# Patient Record
Sex: MALE | Race: WHITE | NOT HISPANIC OR LATINO | Employment: FULL TIME | URBAN - METROPOLITAN AREA
[De-identification: names, ages, dates, MRNs, and addresses within clinical notes are randomized per-mention and may not be internally consistent; named-entity substitution may affect disease eponyms.]

---

## 2017-01-20 ENCOUNTER — OFFICE VISIT (OUTPATIENT)
Dept: FAMILY MEDICINE | Facility: CLINIC | Age: 42
End: 2017-01-20
Payer: COMMERCIAL

## 2017-01-20 VITALS
RESPIRATION RATE: 16 BRPM | HEART RATE: 76 BPM | TEMPERATURE: 98 F | BODY MASS INDEX: 23.14 KG/M2 | WEIGHT: 144 LBS | SYSTOLIC BLOOD PRESSURE: 114 MMHG | HEIGHT: 66 IN | DIASTOLIC BLOOD PRESSURE: 64 MMHG

## 2017-01-20 DIAGNOSIS — K21.9 GASTROESOPHAGEAL REFLUX DISEASE WITHOUT ESOPHAGITIS: Primary | ICD-10-CM

## 2017-01-20 PROCEDURE — 99203 OFFICE O/P NEW LOW 30 MIN: CPT | Performed by: FAMILY MEDICINE

## 2017-01-20 NOTE — NURSING NOTE
"Chief Complaint   Patient presents with     Gastrointestinal Problem     concerns with decreased appetite and heartburn x1 week     Initial /64 mmHg  Pulse 76  Temp(Src) 98  F (36.7  C) (Oral)  Resp 16  Ht 5' 6\" (1.676 m)  Wt 144 lb (65.318 kg)  BMI 23.25 kg/m2 Estimated body mass index is 23.25 kg/(m^2) as calculated from the following:    Height as of this encounter: 5' 6\" (1.676 m).    Weight as of this encounter: 144 lb (65.318 kg)..  BP completed using cuff size regular.            Aracely Garner/DALY    "

## 2017-01-20 NOTE — MR AVS SNAPSHOT
After Visit Summary   1/20/2017    Yordy Rios    MRN: [id number]           Patient Information     Date Of Birth          1975        Visit Information        Provider Department      1/20/2017 3:00 PM Mary Beth North MD [facility]        Today's Diagnoses     Gastroesophageal reflux disease without esophagitis    -  1       Care Instructions      Follow up in 1 month or sooner if needed    Lifestyle Changes for Controlling GERD  When you have GERD, stomach acid feels as if it s backing up toward your mouth. Whether or not you take medication to control your GERD, your symptoms can often be improved with lifestyle changes. Talk to your doctor about the following suggestions, which may help you get relief from your symptoms.  Raise Your Head    Reflux is more likely to strike when you re lying down flat, because stomach fluid can flow backward more easily. Raising the head of your bed 4 to 6 inches can help. To do this:    Slide blocks or books under the legs at the head of your bed. Or, place a wedge under the mattress. Many [employer] can make a suitable wedge for you. The wedge should run from your waist to the top of your head.    Don t just prop your head on several pillows. This increases pressure on your stomach. It can make GERD worse.  Watch Your Eating Habits  Certain foods may increase the acid in your stomach or relax the lower esophageal sphincter, making GERD more likely. It s best to avoid the following:    Coffee, tea, and carbonated drinks (with and without caffeine)    Fatty, fried, or spicy food    Mint, chocolate, onions, and tomatoes    Any other foods that seem to irritate your stomach or cause you pain  Relieve the Pressure    Eat smaller meals, even if you have to eat more often.    Don t lie down right after you eat. Wait a few hours for your stomach to empty.    Avoid tight belts and tight-fitting clothes.    Lose excess  weight.  Tobacco and Alcohol  Avoid smoking tobacco and drinking alcohol. They can make GERD symptoms worse.    1912-4853 [employer]. [address] [place] [address], [place] [address]. All rights reserved. This information is not intended as a substitute for professional medical care. Always follow your healthcare professional's instructions.        Discharge Instructions for Gastroesophageal Reflux Disease (GERD)  Gastroesophageal reflux disease (GERD) is a backflow of acid from the stomach into the swallowing tube (esophagus).  Home care  These home care steps can help you manage GERD:    Maintain a healthy weight. Get help to lose any extra pounds.    Avoid lying down after meals.    Avoid eating late at night.    Elevate the head of your bed by 6 inches. You can do this by placing wooden blocks under the head of your bed.    Avoid wearing tight-fitting clothes.    Avoid foods that might irritate your stomach, such as the following:    Alcohol    Fat    Chocolate    Caffeine    Spearmint or peppermint    Talk to your doctor if you are taking any of the following medications. These medications can make GERD symptoms worse:    Calcium channel blockers    Theophylline    Anticholinergic medications such as oxybutynin and benzatropine    Begin an exercise program. Ask your doctor how to get started. You can benefit from simple activities, such as walking or gardening.    Break the smoking habit. Enroll in a stop-smoking program to improve your chances of success.    Limit alcohol intake to no more than 2 drinks a day.    Take your medications exactly as directed. Don t skip doses.    Avoid over-the-counter nonsteroidal anti-inflammatory drugs, such as aspirin and ibuprofen (Advil, Motrin).    If possible, avoid nitrates (heart medications such as nitroglycerin and Isordil).  Follow-up care  Make a follow-up appointment as directed by our staff.     When to seek medical care  Call your doctor immediately if you  "have any of the following:    Trouble swallowing    Pain when swallowing    Feeling of food caught in your chest or throat    Pain in the neck, chest, or back    Heartburn that causes you to vomit    Vomiting blood    Black or tarry stools (from digested blood)    More saliva (watering of the mouth) than usual    Weight loss of more than 3% to 5% of your total body weight in a month    Hoarseness or sore throat that won t go away    Choking, coughing, or wheezing     5989-3066 [employer]. [address] [place] [address]. All rights reserved. This information is not intended as a substitute for professional medical care. Always follow your healthcare professional's instructions.              Follow-ups after your visit        Who to contact     If you have questions or need follow up information about today's clinic visit or your schedule please contact [facility] directly at [phone].  Normal or non-critical lab and imaging results will be communicated to you by [employer], letter or phone within 4 business days after the clinic has received the results. If you do not hear from us within 7 days, please contact the clinic through [employer] or phone. If you have a critical or abnormal lab result, we will notify you by phone as soon as possible.  Submit refill requests through [employer] or call your pharmacy and they will forward the refill request to us. Please allow 3 business days for your refill to be completed.          Additional Information About Your Visit        [employer] Information     [employer] lets you send messages to your doctor, view your test results, renew your prescriptions, schedule appointments and more. To sign up, go to [email or web address] . Click on \"Log in\" on the left side of the screen, which will take you to the Welcome page. Then click on \"Sign up Now\" on the right side of the page.     You will be asked to enter the access code listed below, as " "well as some personal information. Please follow the directions to create your username and password.     Your access code is: [email or web address]  Expires: 2017  3:25 PM     Your access code will  in 90 days. If you need help or a new code, please call your [place] clinic or [phone].        Care EveryWhere ID     This is your Care EveryWhere ID. This could be used by other organizations to access your [facility] medical records  [id number]        Your Vitals Were     Pulse Temperature Respirations Height BMI (Body Mass Index)       76 98  F (36.7  C) (Oral) 16 5' 6\" (1.676 m) 23.25 kg/m2        Blood Pressure from Last 3 Encounters:   17 114/64   09 112/66   09 122/70    Weight from Last 3 Encounters:   17 144 lb (65.318 kg)   09 141 lb (63.957 kg)   08 147 lb (66.679 kg)              Today, you had the following     No orders found for display         Today's Medication Changes          These changes are accurate as of: 17  3:25 PM.  If you have any questions, ask your nurse or doctor.               Start taking these medicines.        Dose/Directions    omeprazole 20 MG CR capsule   Commonly known as:  priLOSEC   Used for:  Gastroesophageal reflux disease without esophagitis   Started by:  Mary Beth North MD        Dose:  20 mg   Take 1 capsule (20 mg) by mouth daily   Quantity:  30 capsule   Refills:  0            Where to get your medicines      These medications were sent to [facility]/pharmacy # - [place], [place] - [address]  [address] [facility] [address]     Phone:  [phone]    - omeprazole 20 MG CR capsule             Primary Care Provider    None Specified       No primary provider on file.        Thank you!     Thank you for choosing [facility]  for your care. Our goal is always to provide you with excellent care. Hearing back from our patients is one way we can continue to improve our services. Please " take a few minutes to complete the written survey that you may receive in the mail after your visit with us. Thank you!             Your Updated Medication List - Protect others around you: Learn how to safely use, store and throw away your medicines at www.disposemymeds.org.          This list is accurate as of: 1/20/17  3:25 PM.  Always use your most recent med list.                   Brand Name Dispense Instructions for use    omeprazole 20 MG CR capsule    priLOSEC    30 capsule    Take 1 capsule (20 mg) by mouth daily

## 2017-01-20 NOTE — PROGRESS NOTES
"  SUBJECTIVE:                                                    Yordy Rios is a 41 year old male who presents to clinic today for the following health issues:      Gerd/Heartburn  Duration of complaint:1 week  Description of symptoms:    food getting stuck: no   nausea/vomiting: no   abdominal pain: YES - epigastric   black/tarry or bloody stools: no :  worse with no particular food or drink.  current NSAID/Aspirin use: no  Constipation: YES  Gas: YES with some belching     Therapies tried and outcome: rolaids without relief     Drinks pop daily at least 2 cans a day.   Job stress this week along with lack of sleep. Has been skipping lunch this week and having a light supper.   Per patient, he has had similar problems in the past especially when really stressed.     Problem list and histories reviewed & adjusted, as indicated.  Additional history: as documented    Problem list, Medication list, Allergies, and Medical/Social/Surgical histories reviewed in EPIC and updated as appropriate.    ROS:  Constitutional, HEENT, cardiovascular, pulmonary, gi, psych,  systems are negative, except as otherwise noted.    OBJECTIVE:                                                    /64 mmHg  Pulse 76  Temp(Src) 98  F (36.7  C) (Oral)  Resp 16  Ht 5' 6\" (1.676 m)  Wt 144 lb (65.318 kg)  BMI 23.25 kg/m2  Body mass index is 23.25 kg/(m^2).  GENERAL: healthy, alert and no distress  NECK: no adenopathy, no asymmetry, masses, or scars and thyroid normal to palpation  RESP: lungs clear to auscultation - no rales, rhonchi or wheezes  CV: regular rate and rhythm, normal S1 S2, no S3 or S4, no murmur, click or rub, no peripheral edema and peripheral pulses strong  ABDOMEN: tenderness epigastric and bowel sounds normal  MS: no gross musculoskeletal defects noted, no edema  PSYCH: mentation appears normal, affect normal/bright    Diagnostic Test Results:  none      ASSESSMENT/PLAN:                                             "          1. Gastroesophageal reflux disease without esophagitis  - will treat with PPI. Lifestyle modifications including stress management discussed.   - omeprazole (PRILOSEC) 20 MG CR capsule; Take 1 capsule (20 mg) by mouth daily  Dispense: 30 capsule; Refill: 0    See Patient Instructions    Mary Beth North MD  [facility]

## 2017-01-20 NOTE — PATIENT INSTRUCTIONS
Follow up in 1 month or sooner if needed    Lifestyle Changes for Controlling GERD  When you have GERD, stomach acid feels as if it s backing up toward your mouth. Whether or not you take medication to control your GERD, your symptoms can often be improved with lifestyle changes. Talk to your doctor about the following suggestions, which may help you get relief from your symptoms.  Raise Your Head    Reflux is more likely to strike when you re lying down flat, because stomach fluid can flow backward more easily. Raising the head of your bed 4 to 6 inches can help. To do this:    Slide blocks or books under the legs at the head of your bed. Or, place a wedge under the mattress. Many [employer] can make a suitable wedge for you. The wedge should run from your waist to the top of your head.    Don t just prop your head on several pillows. This increases pressure on your stomach. It can make GERD worse.  Watch Your Eating Habits  Certain foods may increase the acid in your stomach or relax the lower esophageal sphincter, making GERD more likely. It s best to avoid the following:    Coffee, tea, and carbonated drinks (with and without caffeine)    Fatty, fried, or spicy food    Mint, chocolate, onions, and tomatoes    Any other foods that seem to irritate your stomach or cause you pain  Relieve the Pressure    Eat smaller meals, even if you have to eat more often.    Don t lie down right after you eat. Wait a few hours for your stomach to empty.    Avoid tight belts and tight-fitting clothes.    Lose excess weight.  Tobacco and Alcohol  Avoid smoking tobacco and drinking alcohol. They can make GERD symptoms worse.    4173-4196 [employer]. [address] [place] [address], [place] [address]. All rights reserved. This information is not intended as a substitute for professional medical care. Always follow your healthcare professional's instructions.        Discharge Instructions for Gastroesophageal Reflux Disease  (GERD)  Gastroesophageal reflux disease (GERD) is a backflow of acid from the stomach into the swallowing tube (esophagus).  Home care  These home care steps can help you manage GERD:    Maintain a healthy weight. Get help to lose any extra pounds.    Avoid lying down after meals.    Avoid eating late at night.    Elevate the head of your bed by 6 inches. You can do this by placing wooden blocks under the head of your bed.    Avoid wearing tight-fitting clothes.    Avoid foods that might irritate your stomach, such as the following:    Alcohol    Fat    Chocolate    Caffeine    Spearmint or peppermint    Talk to your doctor if you are taking any of the following medications. These medications can make GERD symptoms worse:    Calcium channel blockers    Theophylline    Anticholinergic medications such as oxybutynin and benzatropine    Begin an exercise program. Ask your doctor how to get started. You can benefit from simple activities, such as walking or gardening.    Break the smoking habit. Enroll in a stop-smoking program to improve your chances of success.    Limit alcohol intake to no more than 2 drinks a day.    Take your medications exactly as directed. Don t skip doses.    Avoid over-the-counter nonsteroidal anti-inflammatory drugs, such as aspirin and ibuprofen (Advil, Motrin).    If possible, avoid nitrates (heart medications such as nitroglycerin and Isordil).  Follow-up care  Make a follow-up appointment as directed by our staff.     When to seek medical care  Call your doctor immediately if you have any of the following:    Trouble swallowing    Pain when swallowing    Feeling of food caught in your chest or throat    Pain in the neck, chest, or back    Heartburn that causes you to vomit    Vomiting blood    Black or tarry stools (from digested blood)    More saliva (watering of the mouth) than usual    Weight loss of more than 3% to 5% of your total body weight in a month    Hoarseness or sore throat  that won t go away    Choking, coughing, or wheezing     8631-1019 [employer]. [address] [place] [address], [place] [address]. All rights reserved. This information is not intended as a substitute for professional medical care. Always follow your healthcare professional's instructions.

## 2017-05-02 ENCOUNTER — OFFICE VISIT (OUTPATIENT)
Dept: FAMILY MEDICINE | Facility: CLINIC | Age: 42
End: 2017-05-02
Payer: COMMERCIAL

## 2017-05-02 VITALS
RESPIRATION RATE: 18 BRPM | BODY MASS INDEX: 23.53 KG/M2 | DIASTOLIC BLOOD PRESSURE: 62 MMHG | OXYGEN SATURATION: 99 % | WEIGHT: 146.44 LBS | HEART RATE: 69 BPM | HEIGHT: 66 IN | TEMPERATURE: 97.8 F | SYSTOLIC BLOOD PRESSURE: 120 MMHG

## 2017-05-02 DIAGNOSIS — M79.10 MYALGIA: ICD-10-CM

## 2017-05-02 DIAGNOSIS — R25.2 HAND CRAMP: Primary | ICD-10-CM

## 2017-05-02 DIAGNOSIS — M62.81 MUSCLE WEAKNESS OF RIGHT ARM: ICD-10-CM

## 2017-05-02 PROCEDURE — 36415 COLL VENOUS BLD VENIPUNCTURE: CPT | Performed by: FAMILY MEDICINE

## 2017-05-02 PROCEDURE — 83735 ASSAY OF MAGNESIUM: CPT | Performed by: FAMILY MEDICINE

## 2017-05-02 PROCEDURE — 80048 BASIC METABOLIC PNL TOTAL CA: CPT | Performed by: FAMILY MEDICINE

## 2017-05-02 PROCEDURE — 99213 OFFICE O/P EST LOW 20 MIN: CPT | Performed by: FAMILY MEDICINE

## 2017-05-02 NOTE — MR AVS SNAPSHOT
After Visit Summary   5/2/2017    Yordy Rios    MRN: [id number]           Patient Information     Date Of Birth          1975        Visit Information        Provider Department      5/2/2017 4:10 PM Thais Luke MD [facility]        Today's Diagnoses     Hand cramp    -  1    Myalgia        Muscle weakness of right arm           Follow-ups after your visit        Additional Services     OCCUPATIONAL THERAPY REFERRAL       *This therapy referral will be filtered to a centralized scheduling office at [facility] and the patient will receive a call to schedule an appointment at a [facility] location most convenient for them. *     [facility] provides Occupational Therapy evaluation and treatment and many specialty services across the [facility] system.  If requesting a specialty program, please choose from the list below.    If you have not heard from the scheduling office within 2 business days, please call [phone] for all locations, with the exception of [place], please call [phone].     Treatment: Evaluation & Treatment  Special Instructions/Modalities: evaluate and treat  Special Programs: hand therapy    Please be aware that coverage of these services is subject to the terms and limitations of your health insurance plan.  Call member services at your health plan with any benefit or coverage questions.      **Note to Provider:  If you are referring outside of [facility] for the therapy appointment, please list the name of the location in the  special instructions  above, print the referral and give to the patient to schedule the appointment.                  Who to contact     If you have questions or need follow up information about today's clinic visit or your schedule please contact [facility] directly at [phone].  Normal or non-critical lab and imaging results will be communicated to you by [facility]  "letter or phone within 4 business days after the clinic has received the results. If you do not hear from us within 7 days, please contact the clinic through [employer] or phone. If you have a critical or abnormal lab result, we will notify you by phone as soon as possible.  Submit refill requests through [employer] or call your pharmacy and they will forward the refill request to us. Please allow 3 business days for your refill to be completed.          Additional Information About Your Visit        [employer] Information     [employer] lets you send messages to your doctor, view your test results, renew your prescriptions, schedule appointments and more. To sign up, go to [email or web address] . Click on \"Log in\" on the left side of the screen, which will take you to the Welcome page. Then click on \"Sign up Now\" on the right side of the page.     You will be asked to enter the access code listed below, as well as some personal information. Please follow the directions to create your username and password.     Your access code is: [email or web address]  Expires: 2017  4:49 PM     Your access code will  in 90 days. If you need help or a new code, please call your [facility] clinic or [phone].        Care EveryWhere ID     This is your Care EveryWhere ID. This could be used by other organizations to access your [facility] medical records  [id number]        Your Vitals Were     Pulse Temperature Respirations Height Pulse Oximetry BMI (Body Mass Index)    69 97.8  F (36.6  C) (Oral) 18 5' 6\" (1.676 m) 99% 23.64 kg/m2       Blood Pressure from Last 3 Encounters:   17 120/62   17 114/64   09 112/66    Weight from Last 3 Encounters:   17 146 lb 7 oz (66.4 kg)   17 144 lb (65.3 kg)   09 141 lb (64 kg)              We Performed the Following     Basic metabolic panel     Magnesium     OCCUPATIONAL THERAPY REFERRAL        Primary Care Provider    None Specified       No primary provider on " file.        Thank you!     Thank you for choosing [facility]  for your care. Our goal is always to provide you with excellent care. Hearing back from our patients is one way we can continue to improve our services. Please take a few minutes to complete the written survey that you may receive in the mail after your visit with us. Thank you!             Your Updated Medication List - Protect others around you: Learn how to safely use, store and throw away your medicines at www.disposemymeds.org.      Notice  As of 5/2/2017  4:49 PM    You have not been prescribed any medications.

## 2017-05-02 NOTE — PROGRESS NOTES
SUBJECTIVE:                                                    Yordy Rios is a 41 year old male who presents to clinic today for the following health issues:      Musculoskeletal problem/pain      Duration: x 1 week    Description  Location: Right Hand    Intensity:  mild    Accompanying signs and symptoms: Cramping Sensation    History  Previous similar problem: no   Previous evaluation:  none    Precipitating or alleviating factors:  Trauma or overuse: no   Aggravating factors include: Typing, Computer Mouse Use    Therapies tried and outcome: Splint--Per Patient Had Some Relief            Problem list and histories reviewed & adjusted, as indicated.  Additional history:   See under ROS    Patient Active Problem List   Diagnosis     Shoulder pain     CARDIOVASCULAR SCREENING; LDL GOAL LESS THAN 160       Current Outpatient Prescriptions   Medication Sig Dispense Refill     multivitamin, therapeutic with minerals (MULTI-VITAMIN) TABS tablet Take 1 tablet by mouth daily  0             Reviewed and updated as needed this visit by clinical staff       Reviewed and updated as needed this visit by Provider         ROS:  CONSTITUTIONAL:NEGATIVE for fever, chills, change in weight  MUSCULOSKELETAL: see below  PSYCHIATRIC: NEGATIVE for changes in mood or affect    Sometimes thumb will twitch.  Sore in thenar area.    Knot in back of right hand.  Will shake it.  Stiffens up. Will stretch hand back.  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   Trying to switch to use left hand more (mousing).  Did start wearing brace off and on yesterday; seemed to help.    Will notice at work (computer and mousing).  Will notice when doing things with thumb on his phone as well.   Can feel some in whole arm with lifting.    Has not noted problems at night.     Has been about a week.    MVI; otherwise no medications.    OBJECTIVE:                                                    /62 (BP Location: Right arm, Patient Position: Chair,  "Cuff Size: Adult Regular)  Pulse 69  Temp 97.8  F (36.6  C) (Oral)  Resp 18  Ht 5' 6\" (1.676 m)  Wt 146 lb 7 oz (66.4 kg)  SpO2 99%  BMI 23.64 kg/m2  Body mass index is 23.64 kg/(m^2).  GENERAL APPEARANCE: alert and no distress  MS: right hand normal in appearance.   Negative Tinel and Phalens.  Nontender at this time.   PSYCH: mentation appears normal and affect normal/bright         ASSESSMENT/PLAN:                                                      Hand cramp  Uncertain etiology.   This is a little more wide spread.   I suspect related to some of his work. ? Overuse.  At this time, discussed some options. Will refer to Hand therapy for evaluation. Splint if they feel this is indicated. Briefly discussed some ergonomics; Hand therapy may be able to discuss more.   - OCCUPATIONAL THERAPY REFERRAL  - Magnesium  - Basic metabolic panel    Myalgia  As above.   - OCCUPATIONAL THERAPY REFERRAL  - Magnesium  - Basic metabolic panel    Muscle weakness of right arm  As above.  - OCCUPATIONAL THERAPY REFERRAL    Follow up prn or as previously directed.  Consider Hand ortho if not improving.       Thais Luke MD, MD  [facility]    "

## 2017-05-02 NOTE — LETTER
May 4, 2017      Yordy Rios  [address] [facility] [address]        Dear Mr. Scales Gabriel,    Enclosed is a copy of your recent results.    All your tests look normal.    Let me know if you have any difficulty scheduling with Hand therapy!    Sincerely,     Thais Luke MD

## 2017-05-02 NOTE — NURSING NOTE
"Chief Complaint   Patient presents with     Hand Pain     Right Hand       Initial /62 (BP Location: Right arm, Patient Position: Chair, Cuff Size: Adult Regular)  Pulse 69  Temp 97.8  F (36.6  C) (Oral)  Resp 18  Ht 5' 6\" (1.676 m)  Wt 146 lb 7 oz (66.4 kg)  SpO2 99%  BMI 23.64 kg/m2 Estimated body mass index is 23.64 kg/(m^2) as calculated from the following:    Height as of this encounter: 5' 6\" (1.676 m).    Weight as of this encounter: 146 lb 7 oz (66.4 kg).  Medication Reconciliation: radha Parrish CMA (AAMA) 5/2/2017 4:13 PM      "

## 2017-05-03 LAB
ANION GAP SERPL CALCULATED.3IONS-SCNC: 5 MMOL/L (ref 3–14)
BUN SERPL-MCNC: 12 MG/DL (ref 7–30)
CALCIUM SERPL-MCNC: 8.8 MG/DL (ref 8.5–10.1)
CHLORIDE SERPL-SCNC: 105 MMOL/L (ref 94–109)
CO2 SERPL-SCNC: 31 MMOL/L (ref 20–32)
CREAT SERPL-MCNC: 1.19 MG/DL (ref 0.66–1.25)
GFR SERPL CREATININE-BSD FRML MDRD: 68 ML/MIN/1.7M2
GLUCOSE SERPL-MCNC: 96 MG/DL (ref 70–99)
MAGNESIUM SERPL-MCNC: 2.3 MG/DL (ref 1.6–2.3)
POTASSIUM SERPL-SCNC: 3.9 MMOL/L (ref 3.4–5.3)
SODIUM SERPL-SCNC: 141 MMOL/L (ref 133–144)

## 2017-05-07 RX ORDER — MULTIPLE VITAMINS W/ MINERALS TAB 9MG-400MCG
1 TAB ORAL DAILY
Refills: 0 | COMMUNITY
Start: 2017-05-07 | End: 2018-11-14

## 2017-05-19 ENCOUNTER — THERAPY VISIT (OUTPATIENT)
Dept: OCCUPATIONAL THERAPY | Facility: CLINIC | Age: 42
End: 2017-05-19
Payer: COMMERCIAL

## 2017-05-19 DIAGNOSIS — M79.641 PAIN OF RIGHT HAND: Primary | ICD-10-CM

## 2017-05-19 DIAGNOSIS — R29.898 RIGHT HAND WEAKNESS: ICD-10-CM

## 2017-05-19 PROCEDURE — 97165 OT EVAL LOW COMPLEX 30 MIN: CPT | Mod: GO | Performed by: OCCUPATIONAL THERAPIST

## 2017-05-19 PROCEDURE — 97140 MANUAL THERAPY 1/> REGIONS: CPT | Mod: GO | Performed by: OCCUPATIONAL THERAPIST

## 2017-05-19 PROCEDURE — 97110 THERAPEUTIC EXERCISES: CPT | Mod: GO | Performed by: OCCUPATIONAL THERAPIST

## 2017-05-19 NOTE — MR AVS SNAPSHOT
"              After Visit Summary   5/19/2017    Yordy Rios    MRN: [id number]           Patient Information     Date Of Birth          1975        Visit Information        Provider Department      5/19/2017 8:00 AM Karen De La Cruz OT [facility]        Today's Diagnoses     Pain of right hand    -  1    Right hand weakness           Follow-ups after your visit        Your next 10 appointments already scheduled     May 30, 2017  8:30 AM CDT   [facility] ([facility]  )    [address] [address]   [phone]              Who to contact     If you have questions or need follow up information about today's clinic visit or your schedule please contact [facility] directly at [phone].  Normal or non-critical lab and imaging results will be communicated to you by SqueezeCMMhart, letter or phone within 4 business days after the clinic has received the results. If you do not hear from us within 7 days, please contact the clinic through SqueezeCMMhart or phone. If you have a critical or abnormal lab result, we will notify you by phone as soon as possible.  Submit refill requests through USMD or call your pharmacy and they will forward the refill request to us. Please allow 3 business days for your refill to be completed.          Additional Information About Your Visit        MyChart Information     USMD lets you send messages to your doctor, view your test results, renew your prescriptions, schedule appointments and more. To sign up, go to [email or web address] . Click on \"Log in\" on the left side of the screen, which will take you to the Welcome page. Then click on \"Sign up Now\" on the right side of the page.     You will be asked to enter the access code listed below, as well as some personal information. Please follow the directions to create your username and password.     Your access code is: " [email or web address]  Expires: 2017  4:49 PM     Your access code will  in 90 days. If you need help or a new code, please call your [place] clinic or [phone].        Care EveryWhere ID     This is your Care EveryWhere ID. This could be used by other organizations to access your [place] medical records  [id number]         Blood Pressure from Last 3 Encounters:   17 120/62   17 114/64   09 112/66    Weight from Last 3 Encounters:   17 66.4 kg (146 lb 7 oz)   17 65.3 kg (144 lb)   09 64 kg (141 lb)              We Performed the Following     HC OT EVAL, LOW COMPLEXITY     MANUAL THER TECH,1+REGIONS,EA 15 MIN     THERAPEUTIC EXERCISES        Primary Care Provider    None Specified       No primary provider on file.        Thank you!     Thank you for choosing [facility]  for your care. Our goal is always to provide you with excellent care. Hearing back from our patients is one way we can continue to improve our services. Please take a few minutes to complete the written survey that you may receive in the mail after your visit with us. Thank you!             Your Updated Medication List - Protect others around you: Learn how to safely use, store and throw away your medicines at www.disposemymeds.org.          This list is accurate as of: 17 10:31 AM.  Always use your most recent med list.                   Brand Name Dispense Instructions for use    Multi-vitamin Tabs tablet      Take 1 tablet by mouth daily

## 2017-05-30 ENCOUNTER — THERAPY VISIT (OUTPATIENT)
Dept: OCCUPATIONAL THERAPY | Facility: CLINIC | Age: 42
End: 2017-05-30
Payer: COMMERCIAL

## 2017-05-30 DIAGNOSIS — R29.898 RIGHT HAND WEAKNESS: ICD-10-CM

## 2017-05-30 DIAGNOSIS — M79.641 PAIN OF RIGHT HAND: ICD-10-CM

## 2017-05-30 PROCEDURE — 97140 MANUAL THERAPY 1/> REGIONS: CPT | Mod: GO | Performed by: OCCUPATIONAL THERAPIST

## 2017-05-30 PROCEDURE — 97112 NEUROMUSCULAR REEDUCATION: CPT | Mod: GO | Performed by: OCCUPATIONAL THERAPIST

## 2017-06-13 ENCOUNTER — THERAPY VISIT (OUTPATIENT)
Dept: OCCUPATIONAL THERAPY | Facility: CLINIC | Age: 42
End: 2017-06-13
Payer: COMMERCIAL

## 2017-06-13 DIAGNOSIS — R29.898 RIGHT HAND WEAKNESS: ICD-10-CM

## 2017-06-13 DIAGNOSIS — M79.641 PAIN OF RIGHT HAND: ICD-10-CM

## 2017-06-13 PROCEDURE — 97110 THERAPEUTIC EXERCISES: CPT | Mod: GO | Performed by: OCCUPATIONAL THERAPIST

## 2017-06-13 PROCEDURE — 97140 MANUAL THERAPY 1/> REGIONS: CPT | Mod: GO | Performed by: OCCUPATIONAL THERAPIST

## 2017-06-13 NOTE — PROGRESS NOTES
Hand Therapy Discharge Summary    Current Date:  6/13/2017    Initial Evaluation Date:  5/19/17  Reporting period is from 5/19/17 to 6/13/2017  Number of Visits:  3    Diagnosis: R hand cramping, myalgia and muscle weakness  Onset: April 2017  MD order: 5/2/17    Subjective  Subjective changes as noted by patient:  things have been good, haven't really been using the brace   Initial Pain Levels:  0/10 at rest;  7/10 with use  Current Pain Levels:  0/10 at rest;  0/10 with use  Functional changes noted by patient:  No problems with my hands  Patient has noted adverse reaction to:  None    Functional Outcome Measure:   Upper Extremity Functional Index Score:  SCORE:   Column Totals: 80/80  5/19/17:  74/80  (A lower score indicates greater disability.)      Objective      Objective  Pain Level Report  VAS(0-10) 5/19/2017 6/13/17   At Rest: 0 0   At Worst: 7 0     Edema:  NONE  Sensation: WNL throughout all nerve distributions; per patient report.  Originally pt states tingling in median nerve distribution.    Observation:  Pt with forward head and protracted shoulders.  Tightness of B upper trap as well    ROM:  Wrist  5/19/2017   AROM(PROM) Left Right   Extension 74 75   Flexion 73 77   RD 16 16   UD 35 32     Special Tests:  Date 5/19/2017    Side Right    Elbow Flexion Test NT    Tinels at Guyon's Canal neg    Tinels at Cubital Tunnel neg    Ulnar Nerve ULTT Passive tension distal; active ~20% motion    Paresthesias None in the past week    [place]artenburg's Sign neg    Phalens neg    Tinels at carpal neg    Tinels at pronator neg    Median Nerve ULTT Neg with passive    Radial Nerve ULTT Neg with passive    GAURAV's fatigue      Palaption: tightness noted in wrist flexors and extensors as well as thenars and thumb adductor.  Normal carpal mobility    Palpation Ulnar Nerve Path: Pain level report on scale 0-10/10  Date 5/19/2017 6/13/17   Side Right right   Guyon's Canal 1-2 0   Cubital Tunnel 0 0   [place] of [place] 1-2  0   Subscapularis 0 0     Strength: (Measured in pounds, pain scale 0-10/10)    Date 5/19/2017 6/13/17   Trials Left Right Left Right   1 78 80 75 92   2       3       Avg       Pain  0       3 Point Pinch  Date 5/19/2017 6/13/17   Trials Left Right Left Right   1 12 12 17 19   2       3       Avg       Pain  0       Lateral Pinch  Date 5/19/2017 6/13/19   Trials Left Right Left Right   1 14 13 17 22   2       3       Avg       Pain  0             Assessment  Response to therapy has been improvement to:  Flexibility:  improved excursion of involved muscles and less tightness in involved muscles  Strength:   and pinch  Pain:  frequency is less, intensity of pain is decreased, duration of pain is decreased and less tender over affected area  Work Performance:  Increased performance  Appropriateness of Rx I have re-evaluated this patient and find that the nature, scope, duration and intensity of the therapy is appropriate for the medical condition of the patient.  Overall Assessment:  Patient's symptoms are resolving.  Patient is independent in home exercise program.  Patient has met Short and Long Term Treatment Goals.  Patient is ready to be discharged from therapy and continue their home treatment program.  STG/LTG:  See goal sheet for details and updates.    Plan  Frequency/Duration:  Discharge from Hand Therapy; continue home program.    Recommendations for Home Program - stretches, postural exercises, massage

## 2018-11-14 ENCOUNTER — OFFICE VISIT (OUTPATIENT)
Dept: PEDIATRICS | Facility: CLINIC | Age: 43
End: 2018-11-14
Payer: COMMERCIAL

## 2018-11-14 VITALS
SYSTOLIC BLOOD PRESSURE: 128 MMHG | OXYGEN SATURATION: 99 % | DIASTOLIC BLOOD PRESSURE: 78 MMHG | HEIGHT: 66 IN | WEIGHT: 146.31 LBS | TEMPERATURE: 97.7 F | HEART RATE: 55 BPM | BODY MASS INDEX: 23.52 KG/M2

## 2018-11-14 DIAGNOSIS — R55 PRE-SYNCOPE: ICD-10-CM

## 2018-11-14 DIAGNOSIS — R07.89 ATYPICAL CHEST PAIN: ICD-10-CM

## 2018-11-14 DIAGNOSIS — N50.812 TESTICULAR PAIN, LEFT: ICD-10-CM

## 2018-11-14 DIAGNOSIS — R42 DIZZINESS: Primary | ICD-10-CM

## 2018-11-14 LAB
ERYTHROCYTE [DISTWIDTH] IN BLOOD BY AUTOMATED COUNT: 13.5 % (ref 10–15)
HCT VFR BLD AUTO: 43.9 % (ref 40–53)
HGB BLD-MCNC: 14.7 G/DL (ref 13.3–17.7)
MCH RBC QN AUTO: 28.3 PG (ref 26.5–33)
MCHC RBC AUTO-ENTMCNC: 33.5 G/DL (ref 31.5–36.5)
MCV RBC AUTO: 84 FL (ref 78–100)
PLATELET # BLD AUTO: 199 10E9/L (ref 150–450)
RBC # BLD AUTO: 5.2 10E12/L (ref 4.4–5.9)
WBC # BLD AUTO: 4.8 10E9/L (ref 4–11)

## 2018-11-14 PROCEDURE — 80053 COMPREHEN METABOLIC PANEL: CPT | Performed by: INTERNAL MEDICINE

## 2018-11-14 PROCEDURE — 85027 COMPLETE CBC AUTOMATED: CPT | Performed by: INTERNAL MEDICINE

## 2018-11-14 PROCEDURE — 36415 COLL VENOUS BLD VENIPUNCTURE: CPT | Performed by: INTERNAL MEDICINE

## 2018-11-14 PROCEDURE — 93000 ELECTROCARDIOGRAM COMPLETE: CPT | Performed by: INTERNAL MEDICINE

## 2018-11-14 PROCEDURE — 99213 OFFICE O/P EST LOW 20 MIN: CPT | Mod: GE | Performed by: INTERNAL MEDICINE

## 2018-11-14 PROCEDURE — 84443 ASSAY THYROID STIM HORMONE: CPT | Performed by: INTERNAL MEDICINE

## 2018-11-14 NOTE — PROGRESS NOTES
"  SUBJECTIVE:   Yordy Rios is a 43 year old male who presents to clinic today for the following health issues:    1. Dizziness  Patient reports dizziness. Present since 1.5 weeks. Put children to bed, laid next to them. Got up and \"felt dizzy.\" Went to bed. Next morning \"got up and felt dizzy.\" Describes and disorientation, imbalance, not like room spinning. Never had LOC. Had some palpitations, nausea. Resolved on own. But has intermittent episodes since. No recent illness. Had a beer the afternoon it first happened, which is atypical.     Wondering if stress is contributing. Work is pretty stressful lately. [place] good on Saturday, a day which he does not work. Sunday when preparing for work week, symptoms. Felt anxious during all episodes.    Also having some left side chest discomfort. Describes as dull ache on left torso, next to left pec. No radiation. Not worse with activity. Not better with rest. Has happened before on occasion. No family hx of early MI. Did some raking that weekend, wondering if it is MSK related. Has hx of GERD. Taking zantac. Not relieving issue. Able to run 2.5 miles twice a week without worsening.    Denies fevers, chills, tinnitus, vision changes, dyspnea, diaphoresis, speech changes, hearing loss.    Has hx of chronic headaches. Dx with brain aneurysm in the past, during HA workup. Had surveillance imaging without progression. Headaches chronic, stable, not increasing in frequency or intensity. Tylenol prn for HA, provides relief.     Overall the above symptoms are improving.    2. Testicular Issue  2 days ago, reports testicle feels swollen and tender. \"Not as bad as yesterday.\" Denies fevers, chills, testicular mass, dysuria, hematuria, night sweats, unexplained weight loss.    Problem list and histories reviewed & adjusted, as indicated.  Additional history: as documented    Patient Active Problem List   Diagnosis     Shoulder pain     CARDIOVASCULAR SCREENING; LDL GOAL LESS THAN " "160     Past Surgical History:   Procedure Laterality Date     HC TOOTH EXTRACTION W/FORCEP         Social History   Substance Use Topics     Smoking status: Never Smoker     Smokeless tobacco: Never Used     Alcohol use 0.0 oz/week     0 Standard drinks or equivalent per week      Comment: occ     Family History   Problem Relation Age of Onset     Family History Negative No family hx of          Current Outpatient Prescriptions   Medication Sig Dispense Refill     RaNITidine HCl (ZANTAC PO)        Allergies   Allergen Reactions     Ibuprofen Other (See Comments)     Mouth lesions     BP Readings from Last 3 Encounters:   11/14/18 114/60   05/02/17 120/62   01/20/17 114/64    Wt Readings from Last 3 Encounters:   11/14/18 146 lb 5 oz (66.4 kg)   05/02/17 146 lb 7 oz (66.4 kg)   01/20/17 144 lb (65.3 kg)         Labs reviewed in EPIC    Reviewed and updated as needed this visit by clinical staff and provider  Tobacco  Allergies  Meds  Med Hx  Fam Hx  Soc Hx        ROS:  7 point ROS completed and were negative aside from the pertinent findings noted in the HPI.    OBJECTIVE:     /78 (BP Location: Right arm, Patient Position: Standing, Cuff Size: Adult Large)  Pulse 55  Temp 97.7  F (36.5  C) (Tympanic)  Ht 5' 6\" (1.676 m)  Wt 146 lb 5 oz (66.4 kg)  SpO2 99%  BMI 23.62 kg/m2  Body mass index is 23.62 kg/(m^2).  GENERAL: healthy, alert and no distress  HEENT: Eyes grossly normal to inspection, TM normal bilaterally, no congestion, no oral lesions, no tonsillar edema or exudates, MMM  NECK: supple, no adenopathy  RESP: lungs clear to auscultation - no rales, rhonchi or wheezes  CV: regular rate and rhythm, normal S1 S2, no S3 or S4, no murmur  GI: soft, non distended, non tender to palpation, no rigidity or guarding, no masses, bowel sounds active  MS: no gross musculoskeletal defects noted. Chest pain not reproducible with palpation.  SKIN: no suspicious lesions or rashes on exposed surfaces  NEURO: " Alert, no focal deficits, normal strength and tone, mentation intact and speech normal. CN II-XII intact. No pronator drift. No nystagmus. Gait normal.  : no testicular edema, non tender to palpation  PSYCH: mentation appears normal, affect normal/bright    EKG: sinus lore 47 BPM, no block/prolong QT    Orthostatic vials in chart, reviewed and not consistent with orthostatic hypotension    ASSESSMENT/PLAN:     (R42) Dizziness  (primary encounter diagnosis)  (R55) Pre-syncope  Differential for the above is broad and includes cardiac, neuro, psych (anxiety) and heme. EKG with sinus lore. No LOC. Possible could have inappropriate chronotropic response to activity, however, he is tolerating running 2.5 miles 2-3 x per week. BP stable in office. Orthostatic vials negative. No report of acute blood loss. Has hx of anxiety, could be contributing. Neuro exam wnl.  - CBC, TSH, CMP  - push fluids  - follow up if not improving, consider holter    (R07.89) Atypical chest pain  EKG with sinus lore, no ischemic changes. Could be msk vs gerd.   - ranitidine prn  - tylenol prn    (N50.812) Testicular pain, left  Resolving. Normal exam findings. No urinary symptoms.  - if worsens, consider UA/UC, testicular US    For additional instructions, see AVS   Symptoms to seek immediate medical care reviewed with patient  RTC if symptoms persist despite the above, sooner with acute worsening/red flags    Pt d/w attending physician, Dr. Live, who agrees with plan     Lane Garcia MD  [facility]    I have discussed the patient with Dr. Garcia and agree with the jointly developed plan as documented above. 42 yo man with 7-10 days of dizziness, worse with standing. Otherwise healthy and active, has hx of stable brain aneurysm that has been followed by neurology. Overall symptoms appear to be resolving, will obtain basic labs. ECG consistent with sinus bradycardia, which is consistent with patient being in  good shape. No hx of syncope, so unlikely that he has any bradycardic pauses. At this time, will push fluids and have patient follow-up if symptoms persist or fail to improve in next couple of weeks.    Rina Live MD  Internal Medicine-Pediatrics

## 2018-11-14 NOTE — MR AVS SNAPSHOT
After Visit Summary   11/14/2018    Yordy Rios    MRN: [id number]           Patient Information     Date Of Birth          1975        Visit Information        Provider Department      11/14/2018 1:30 PM Austin Garcia MD [facility]        Today's Diagnoses     Dizziness    -  1    Pre-syncope          Care Instructions    You will get labs today. We can send the results via ZEturfhart.  If there are abnormalities we will talk next steps.  Push fluids. Drink at least 8 glasses of water per day. 1 glass = 8 oz  Come back and see me 4 wednesdays from now if your symptoms are not resolved.  Come back sooner if your symptoms worsen.      Dizziness (Uncertain Cause)  Dizziness is a common symptom. It may be described as lightheadedness, spinning, or feeling like you are going to faint. Dizziness can have many causes.  Be sure to tell the healthcare provider about:    All medicines you take, including prescription, over-the-counter, herbs, and supplements    Any other symptoms you have    Any health problems you are being treated for    Any past major health problems you've had, such as a heart attack, balance issues, hearing problems, or blood pressure problems    Anything that causes the dizziness to get worse or better  Today's exam did not show an exact cause for your dizziness. Other tests may be needed. Follow up with your healthcare provider.  Home care    Dizziness that occurs with sudden standing may be a sign of mild dehydration. Drink extra fluids for the next few days.    If you recently started a new medicine, stopped a medicine, or had the dose of a current medicine changed, talk with the prescribing healthcare provider. Your medicine plan may need adjustment.    If dizziness lasts more than a few seconds, sit or lie down until it passes. This may help prevent injury in case you pass out. Get up slowly when you feel better.    Don't drive or use power tools or  dangerous equipment until you have had no dizziness for at least 48 hours.  Follow-up care  Follow up with your healthcare provider for further evaluation within the next 7 days or as advised.  When to seek medical advice  Call your healthcare provider for any of the following:    Worsening of symptoms or new symptoms    Passing out or seizure    Repeated vomiting    Headache    Palpitations (the sense that your heart is fluttering or beating fast or hard)    Shortness of breath    Blood in vomit or stool (black or red color)    Weakness of an arm or leg or 1 side of the face    Vision or hearing changes    Trouble walking or speaking    Chest, arm, neck, back, or jaw pain  Date Last Reviewed: 11/1/2017 2000-2018 [employer]. [address] [place] [address], [place] [address]. All rights reserved. This information is not intended as a substitute for professional medical care. Always follow your healthcare professional's instructions.                Follow-ups after your visit        Who to contact     If you have questions or need follow up information about today's clinic visit or your schedule please contact [facility] directly at [phone].  Normal or non-critical lab and imaging results will be communicated to you by [place] Virtuellehart, letter or phone within 4 business days after the clinic has received the results. If you do not hear from us within 7 days, please contact the clinic through [employer] or phone. If you have a critical or abnormal lab result, we will notify you by phone as soon as possible.  Submit refill requests through Mytonomy or call your pharmacy and they will forward the refill request to us. Please allow 3 business days for your refill to be completed.          Additional Information About Your Visit        Mytonomy Information     Mytonomy lets you send messages to your doctor, view your test results, renew your prescriptions, schedule appointments and more. To sign up, go to  [email or web address] . Click on \"Log in\" on the left side of the screen, which will take you to the Welcome page. Then click on \"Sign up Now\" on the right side of the page.     You will be asked to enter the access code listed below, as well as some personal information. Please follow the directions to create your username and password.     Your access code is: [email or web address]  Expires: 2019  2:42 PM     Your access code will  in 90 days. If you need help or a new code, please call your [place] clinic or [phone].        Care EveryWhere ID     This is your Care EveryWhere ID. This could be used by other organizations to access your [place] medical records  [id number]        Your Vitals Were     Pulse Temperature Height Pulse Oximetry BMI (Body Mass Index)       55 97.7  F (36.5  C) (Tympanic) 5' 6\" (1.676 m) 99% 23.62 kg/m2        Blood Pressure from Last 3 Encounters:   18 128/78   17 120/62   17 114/64    Weight from Last 3 Encounters:   18 146 lb 5 oz (66.4 kg)   17 146 lb 7 oz (66.4 kg)   17 144 lb (65.3 kg)              We Performed the Following     CBC with platelets     Comprehensive metabolic panel (BMP + Alb, Alk Phos, ALT, AST, Total. Bili, TP)     EKG 12-lead complete w/read - Clinics     TSH with free T4 reflex        Primary Care Provider Fax #    Physician No Ref-Primary [phone]       No address on file        Equal Access to Services     FABIAN SEAMAN : Zohaib Sweeney, adam cancino, daljit kapoor. So [facility] [phone].    ATENCIÓN: Si habla español, tiene a garrido disposición servicios gratuitos de asistencia lingüística. Llame al [phone].    We comply with applicable federal civil rights laws and [place] laws. We do not discriminate on the basis of race, color, national origin, age, disability, sex, sexual orientation, or gender identity.            Thank you!     " Thank you for choosing [facility]  for your care. Our goal is always to provide you with excellent care. Hearing back from our patients is one way we can continue to improve our services. Please take a few minutes to complete the written survey that you may receive in the mail after your visit with us. Thank you!             Your Updated Medication List - Protect others around you: Learn how to safely use, store and throw away your medicines at www.disposemymeds.org.          This list is accurate as of 11/14/18  2:43 PM.  Always use your most recent med list.                   Brand Name Dispense Instructions for use Diagnosis    ZANTAC PO       Dizziness, Pre-syncope

## 2018-11-14 NOTE — PATIENT INSTRUCTIONS
You will get labs today. We can send the results via [employer].  If there are abnormalities we will talk next steps.  Push fluids. Drink at least 8 glasses of water per day. 1 glass = 8 oz  Come back and see me 4 wednesdays from now if your symptoms are not resolved.  Come back sooner if your symptoms worsen.      Dizziness (Uncertain Cause)  Dizziness is a common symptom. It may be described as lightheadedness, spinning, or feeling like you are going to faint. Dizziness can have many causes.  Be sure to tell the healthcare provider about:    All medicines you take, including prescription, over-the-counter, herbs, and supplements    Any other symptoms you have    Any health problems you are being treated for    Any past major health problems you've had, such as a heart attack, balance issues, hearing problems, or blood pressure problems    Anything that causes the dizziness to get worse or better  Today's exam did not show an exact cause for your dizziness. Other tests may be needed. Follow up with your healthcare provider.  Home care    Dizziness that occurs with sudden standing may be a sign of mild dehydration. Drink extra fluids for the next few days.    If you recently started a new medicine, stopped a medicine, or had the dose of a current medicine changed, talk with the prescribing healthcare provider. Your medicine plan may need adjustment.    If dizziness lasts more than a few seconds, sit or lie down until it passes. This may help prevent injury in case you pass out. Get up slowly when you feel better.    Don't drive or use power tools or dangerous equipment until you have had no dizziness for at least 48 hours.  Follow-up care  Follow up with your healthcare provider for further evaluation within the next 7 days or as advised.  When to seek medical advice  Call your healthcare provider for any of the following:    Worsening of symptoms or new symptoms    Passing out or seizure    Repeated  vomiting    Headache    Palpitations (the sense that your heart is fluttering or beating fast or hard)    Shortness of breath    Blood in vomit or stool (black or red color)    Weakness of an arm or leg or 1 side of the face    Vision or hearing changes    Trouble walking or speaking    Chest, arm, neck, back, or jaw pain  Date Last Reviewed: 11/1/2017 2000-2018 [employer]. [address] [place] [address]. All rights reserved. This information is not intended as a substitute for professional medical care. Always follow your healthcare professional's instructions.

## 2018-11-14 NOTE — LETTER
[facility]  [address] [place] [id number]                  [phone]   November 20, 2018    Yordy Rios  [address] [facility] [address]        Dear Yordy,     Please find your lab results enclosed. Everything looks great. Your thyroid, blood counts, electrolytes, and liver function all look normal. Interestingly, your bilirubin is slightly elevated. Most commonly this is due to a condition called Gilbert's syndrome, which is a relatively common genetic mutation that affects how your body breaks down and processes bilirubin. We can see it in roughly 5-15% of people, and it doesn't cause any medical problems for people aside from an elevated bilirubin on labs (often the bilirubin will go up illness, stress, exertion or other issues).     It is something that we should consider checking again the next time you have labs drawn -- we can draw a couple of different labs to confirm this diagnosis. Just remind us to do so the next time you come in.     Please let me know if you have any concerns or questions, and feel free to call at any time!     Sincerely,     Rina Live MD for Lane Garcia MD   Internal Medicine-Pediatrics         Results for orders placed or performed in visit on 11/14/18   CBC with platelets   Result Value Ref Range    WBC 4.8 4.0 - 11.0 10e9/L    RBC Count 5.20 4.4 - 5.9 10e12/L    Hemoglobin 14.7 13.3 - 17.7 g/dL    Hematocrit 43.9 40.0 - 53.0 %    MCV 84 78 - 100 fl    MCH 28.3 26.5 - 33.0 pg    MCHC 33.5 31.5 - 36.5 g/dL    RDW 13.5 10.0 - 15.0 %    Platelet Count 199 150 - 450 10e9/L   TSH with free T4 reflex   Result Value Ref Range    TSH 1.11 0.40 - 4.00 mU/L   Comprehensive metabolic panel (BMP + Alb, Alk Phos, ALT, AST, Total. Bili, TP)   Result Value Ref Range    Sodium 137 133 - 144 mmol/L    Potassium 4.1 3.4 - 5.3 mmol/L    Chloride 104 94 - 109 mmol/L    Carbon Dioxide 27 20 - 32 mmol/L    Anion Gap 6 3 - 14 mmol/L     Glucose 86 70 - 99 mg/dL    Urea Nitrogen 15 7 - 30 mg/dL    Creatinine 1.02 0.66 - 1.25 mg/dL    GFR Estimate 80 >60 mL/min/1.7m2    GFR Estimate If Black >90 >60 mL/min/1.7m2    Calcium 9.2 8.5 - 10.1 mg/dL    Bilirubin Total 1.8 (H) 0.2 - 1.3 mg/dL    Albumin 4.1 3.4 - 5.0 g/dL    Protein Total 7.5 6.8 - 8.8 g/dL    Alkaline Phosphatase 58 40 - 150 U/L    ALT 24 0 - 70 U/L    AST 20 0 - 45 U/L

## 2018-11-15 LAB
ALBUMIN SERPL-MCNC: 4.1 G/DL (ref 3.4–5)
ALP SERPL-CCNC: 58 U/L (ref 40–150)
ALT SERPL W P-5'-P-CCNC: 24 U/L (ref 0–70)
ANION GAP SERPL CALCULATED.3IONS-SCNC: 6 MMOL/L (ref 3–14)
AST SERPL W P-5'-P-CCNC: 20 U/L (ref 0–45)
BILIRUB SERPL-MCNC: 1.8 MG/DL (ref 0.2–1.3)
BUN SERPL-MCNC: 15 MG/DL (ref 7–30)
CALCIUM SERPL-MCNC: 9.2 MG/DL (ref 8.5–10.1)
CHLORIDE SERPL-SCNC: 104 MMOL/L (ref 94–109)
CO2 SERPL-SCNC: 27 MMOL/L (ref 20–32)
CREAT SERPL-MCNC: 1.02 MG/DL (ref 0.66–1.25)
GFR SERPL CREATININE-BSD FRML MDRD: 80 ML/MIN/1.7M2
GLUCOSE SERPL-MCNC: 86 MG/DL (ref 70–99)
POTASSIUM SERPL-SCNC: 4.1 MMOL/L (ref 3.4–5.3)
PROT SERPL-MCNC: 7.5 G/DL (ref 6.8–8.8)
SODIUM SERPL-SCNC: 137 MMOL/L (ref 133–144)
TSH SERPL DL<=0.005 MIU/L-ACNC: 1.11 MU/L (ref 0.4–4)

## 2018-11-19 PROBLEM — R17 ELEVATED BILIRUBIN: Status: ACTIVE | Noted: 2018-11-19

## 2022-02-21 ENCOUNTER — HOSPITAL ENCOUNTER (EMERGENCY)
Facility: CLINIC | Age: 47
Discharge: HOME | End: 2022-02-21
Attending: EMERGENCY MEDICINE | Admitting: EMERGENCY MEDICINE
Payer: COMMERCIAL

## 2022-02-21 ENCOUNTER — APPOINTMENT (OUTPATIENT)
Dept: GENERAL RADIOLOGY | Facility: CLINIC | Age: 47
End: 2022-02-21
Attending: EMERGENCY MEDICINE
Payer: COMMERCIAL

## 2022-02-21 VITALS
TEMPERATURE: 98.3 F | SYSTOLIC BLOOD PRESSURE: 141 MMHG | HEART RATE: 54 BPM | DIASTOLIC BLOOD PRESSURE: 88 MMHG | BODY MASS INDEX: 25.76 KG/M2 | RESPIRATION RATE: 14 BRPM | WEIGHT: 159.61 LBS | OXYGEN SATURATION: 100 %

## 2022-02-21 DIAGNOSIS — R07.9 CHEST PAIN, UNSPECIFIED TYPE: ICD-10-CM

## 2022-02-21 LAB
ANION GAP SERPL CALCULATED.3IONS-SCNC: 2 MMOL/L (ref 3–14)
BASOPHILS # BLD AUTO: 0 10E3/UL (ref 0–0.2)
BASOPHILS NFR BLD AUTO: 1 %
BUN SERPL-MCNC: 12 MG/DL (ref 7–30)
CALCIUM SERPL-MCNC: 9.4 MG/DL (ref 8.5–10.1)
CHLORIDE BLD-SCNC: 105 MMOL/L (ref 94–109)
CO2 SERPL-SCNC: 32 MMOL/L (ref 20–32)
CREAT SERPL-MCNC: 1.03 MG/DL (ref 0.66–1.25)
EOSINOPHIL # BLD AUTO: 0.1 10E3/UL (ref 0–0.7)
EOSINOPHIL NFR BLD AUTO: 3 %
ERYTHROCYTE [DISTWIDTH] IN BLOOD BY AUTOMATED COUNT: 13.3 % (ref 10–15)
GFR SERPL CREATININE-BSD FRML MDRD: >90 ML/MIN/1.73M2
GLUCOSE BLD-MCNC: 97 MG/DL (ref 70–99)
HCT VFR BLD AUTO: 44.6 % (ref 40–53)
HGB BLD-MCNC: 14.6 G/DL (ref 13.3–17.7)
HOLD SPECIMEN: NORMAL
HOLD SPECIMEN: NORMAL
IMM GRANULOCYTES # BLD: 0 10E3/UL
IMM GRANULOCYTES NFR BLD: 1 %
LYMPHOCYTES # BLD AUTO: 1.6 10E3/UL (ref 0.8–5.3)
LYMPHOCYTES NFR BLD AUTO: 39 %
MCH RBC QN AUTO: 27.5 PG (ref 26.5–33)
MCHC RBC AUTO-ENTMCNC: 32.7 G/DL (ref 31.5–36.5)
MCV RBC AUTO: 84 FL (ref 78–100)
MONOCYTES # BLD AUTO: 0.5 10E3/UL (ref 0–1.3)
MONOCYTES NFR BLD AUTO: 13 %
NEUTROPHILS # BLD AUTO: 1.9 10E3/UL (ref 1.6–8.3)
NEUTROPHILS NFR BLD AUTO: 43 %
NRBC # BLD AUTO: 0 10E3/UL
NRBC BLD AUTO-RTO: 0 /100
PLATELET # BLD AUTO: 198 10E3/UL (ref 150–450)
POTASSIUM BLD-SCNC: 3.9 MMOL/L (ref 3.4–5.3)
RBC # BLD AUTO: 5.31 10E6/UL (ref 4.4–5.9)
SODIUM SERPL-SCNC: 139 MMOL/L (ref 133–144)
TROPONIN I SERPL HS-MCNC: 10 NG/L
TROPONIN I SERPL HS-MCNC: 9 NG/L
WBC # BLD AUTO: 4.2 10E3/UL (ref 4–11)

## 2022-02-21 PROCEDURE — 36415 COLL VENOUS BLD VENIPUNCTURE: CPT | Performed by: EMERGENCY MEDICINE

## 2022-02-21 PROCEDURE — 80048 BASIC METABOLIC PNL TOTAL CA: CPT | Performed by: EMERGENCY MEDICINE

## 2022-02-21 PROCEDURE — 84484 ASSAY OF TROPONIN QUANT: CPT | Performed by: EMERGENCY MEDICINE

## 2022-02-21 PROCEDURE — 71046 X-RAY EXAM CHEST 2 VIEWS: CPT

## 2022-02-21 PROCEDURE — 93005 ELECTROCARDIOGRAM TRACING: CPT

## 2022-02-21 PROCEDURE — 99285 EMERGENCY DEPT VISIT HI MDM: CPT | Mod: 25

## 2022-02-21 PROCEDURE — 85025 COMPLETE CBC W/AUTO DIFF WBC: CPT | Performed by: EMERGENCY MEDICINE

## 2022-02-21 ASSESSMENT — ENCOUNTER SYMPTOMS
COUGH: 0
NAUSEA: 1
SHORTNESS OF BREATH: 1
VOMITING: 0

## 2022-02-21 NOTE — ED TRIAGE NOTES
C/O CP L sternal a few hours PTA while typing on a computer. Pt also reports feeling anxious during CP. Pain decreased currently. Denies blood thinner use, or cardiac hx. ABC in tact. A/OX4

## 2022-02-22 LAB
ATRIAL RATE - MUSE: 48 BPM
DIASTOLIC BLOOD PRESSURE - MUSE: NORMAL MMHG
INTERPRETATION ECG - MUSE: NORMAL
P AXIS - MUSE: 57 DEGREES
PR INTERVAL - MUSE: 122 MS
QRS DURATION - MUSE: 102 MS
QT - MUSE: 438 MS
QTC - MUSE: 391 MS
R AXIS - MUSE: 74 DEGREES
SYSTOLIC BLOOD PRESSURE - MUSE: NORMAL MMHG
T AXIS - MUSE: 48 DEGREES
VENTRICULAR RATE- MUSE: 48 BPM

## 2022-02-22 NOTE — ED PROVIDER NOTES
History   Chief Complaint:  Chest Pain       HPI   Yordy Rios is a 46 year old male who presents with chest pain. The patient reported his centralized chest pain and neck and upper back stiffness started at 1545 today while he was sitting at his desk working. At first he thought the pain was due to an acid reflux flare up as he gets that from time to time but this time the pain was more severe and lasted longer than usual with concurrent nausea and gassiness. Since the pain was different, he started looking online which only helped him freak out and brought on some shortness of breath. He reported that moving his head to the right exacerbated his chest pain. On examination the chest and back pain had all resolved on their own. The patient has no history of hypertension, hyperlipidemia, diabetes, he does not smoke. He reports no family cardiac history. The patient denies any personal or familial history of PE, DVT, or clotting disorder. The patient reports no recent travel, surgery, or other immobilizations. He does run 3 miles once a week.     Review of Systems   Respiratory: Positive for shortness of breath. Negative for cough.    Cardiovascular: Positive for chest pain.   Gastrointestinal: Positive for nausea. Negative for vomiting.   All other systems reviewed and are negative.    Allergies:  Ibuprofen    Medications:  The patient is currently on no regular medications.    Past Medical History:     Chronic headache   Brain aneurysm      Past Surgical History:    Tooth extraction     Social History:   Presents to the ED: unaccompanied   Marital Status:    Hobbies: Running       Physical Exam     Patient Vitals for the past 24 hrs:   BP Temp Pulse Resp SpO2 Weight   02/21/22 2000 129/79 -- 53 13 99 % --   02/21/22 1945 -- -- 54 13 100 % --   02/21/22 1930 130/82 -- 52 13 99 % --   02/21/22 1915 125/83 -- -- -- -- --   02/21/22 1845 135/83 -- 53 12 99 % --   02/21/22 1747 137/85 98.3  F (36.8  C) 55 18  100 % 72.4 kg (159 lb 9.8 oz)       Physical Exam      Eyes:    Conjunctiva normal  Neck:    Supple, no meningismus.     CV:     Regular rate and rhythm.      No murmurs, rubs or gallops.       No unilateral leg swelling.       2+ radial pulses bilateral.       No lower extremity edema.  PULM:    Clear to auscultation bilateral.       No respiratory distress.      Good air exchange.     No rales or wheezing.     No stridor.  ABD:    Soft, non-tender, non-distended.       No pulsatile masses.       No rebound, guarding or rigidity.  MSK:     No gross deformity to all four extremities.   LYMPH:   No cervical lymphadenopathy.  NEURO:   Alert. Good muscle tone, no atrophy.  Skin:    Warm, dry and intact.    Psych:    Mood is good and affect is appropriate.        Emergency Department Course   ECG  ECG obtained at 1739, ECG read at 1852  Sinus bradycardia. Otherwise normal ECG.  No significant changes as compared to prior, dated 11/14/2018.  Rate 48 bpm. IN interval 122 ms. QRS duration 102 ms. QT/QTc 438/391 ms. P-R-T axes 57 74 48.     Imaging:  Chest XR,  PA & LAT   Final Result   IMPRESSION: Negative chest.        Report per radiology    Laboratory:  Labs Ordered and Resulted from Time of ED Arrival to Time of ED Departure   BASIC METABOLIC PANEL - Abnormal       Result Value    Sodium 139      Potassium 3.9      Chloride 105      Carbon Dioxide (CO2) 32      Anion Gap 2 (*)     Urea Nitrogen 12      Creatinine 1.03      Calcium 9.4      Glucose 97      GFR Estimate >90     TROPONIN I - Normal    Troponin I High Sensitivity 10     TROPONIN I - Normal    Troponin I High Sensitivity 9     CBC WITH PLATELETS AND DIFFERENTIAL    WBC Count 4.2      RBC Count 5.31      Hemoglobin 14.6      Hematocrit 44.6      MCV 84      MCH 27.5      MCHC 32.7      RDW 13.3      Platelet Count 198      % Neutrophils 43      % Lymphocytes 39      % Monocytes 13      % Eosinophils 3      % Basophils 1      % Immature Granulocytes 1       NRBCs per 100 WBC 0      Absolute Neutrophils 1.9      Absolute Lymphocytes 1.6      Absolute Monocytes 0.5      Absolute Eosinophils 0.1      Absolute Basophils 0.0      Absolute Immature Granulocytes 0.0      Absolute NRBCs 0.0          Emergency Department Course:  Reviewed:  I reviewed nursing notes, vitals, past medical history and Care Everywhere    Assessments:   I obtained history and examined the patient as noted above.    I rechecked the patient and explained findings.     Disposition:  The patient was discharged to home.     Impression & Plan     Medical Decision Makin-year-old male presented to the ED with atypical left-sided chest pain.  EKG without ischemic changes.  Initial troponin within normal limits.  Due to the timing of symptoms, delta 2-hour troponin undertaken and unremarkable adequately ruling him out for ACS.  Given the lack of ongoing symptoms, not suggestive of PE, aortic dissection/aneurysm.  He has a significant musculoskeletal component of his pain particularly increased pain with range of motion of the neck.  Differential diagnosis would include muscle strain, costochondritis, pleurisy, atypical reflux, esophageal spasm.  Patient safe to discharge home with close follow-up with PCP.    Diagnosis:    ICD-10-CM    1. Chest pain, unspecified type  R07.9        Scribe Disclosure:  I, Amber Malloy, am serving as a scribe at 6:46 PM on 2022 to document services personally performed by Ramon Jaramillo MD based on my observations and the provider's statements to me.         Ramon Jaramillo MD  [phone]

## 2025-04-21 NOTE — PROGRESS NOTES
"CLINICAL NUTRITION SERVICES - ASSESSMENT NOTE    RECOMMENDATIONS FOR MDs/PROVIDERS TO ORDER:  None at this time    Registered Dietitian Interventions:  -Offered room service appropriate with assist. Pt politely declined.  -Offered oral supplements. Pt politely declined. Gave oral supplement menu and explained pt may order in the future if he changes his mind. Placed order, pt may order oral supplements prn (please do not count toward pt's restrictions).   -Gave room service sodium/saturated fat menu.    Future/Additional Recommendations:  -Consider modifying diet to a 2g vs 3 g sodium diet order. Continue fluid restriction. Encourage pt to self-select tolerated foods/beverages. Rec small, frequent meals and use of oral supplements.   -Order a multivitamin with minerals to help meet micronutrient needs, if inadequate intake continues.   -Monitor BG control. Hgb A1c of 6.1 on 4/22. BG was 116 on 4/21.   -Consider scheduling antiemetics/antinauseants ~20 minutes prior to meals to help optimize nausea control.        REASON FOR ASSESSMENT  LOS    INFORMATION OBTAINED  Assessed patient in room briefly.    NUTRITION HISTORY  Per RD 11/2024, \"Pt reports having 2g Na+ ed in past, but concerned over fluid weight gain and would like more information about low sodium diet. Also reviewed fluid restriction too. Noted that pt has declined low sodium diet education with our service in the past d/t already having had low sodium education. Writer briefly reviewed handouts, as pt seemingly wanted to look over at his own leisure and declined any further information at this time. Pt reports struggling with ordering on his diet restrictions, feels like he cannot order much. Explained Room Service with Assistance and pt would like this to help him with meal choices.Placed on Room Service with Assist to help reduce struggle with meal ordering.\"    Unable to obtain nutrition history. Pt was on the phone and visit was short. Per pt, has " Hand Therapy Initial Evaluation    Current Date:  5/19/2017       Diagnosis: R hand cramping, myalgia and muscle weakness  Onset: April 2017  MD order: 5/2/17       Leslie Rios is a 41 year old Right hand dominant male.    Patient reports symptoms of pain, stiffness/loss of motion, weakness/loss of strength, tingling  and cramping of the right hand which occurred due to unknown etiology, possible d/t overuse at work. Since onset symptoms are Gradually getting better.  Special tests:  none.  Previous treatment: OTC wrist support.    General health as reported by patient is good.  Pertinent medical history includes:migraines/headaches, numbness/tingling, brain annuerism.  Medical allergies:medication.  Surgical history: other: hernia.  Medication history: none.    Current occupation is   Currently working in normal job without restrictions  Job Tasks: operating a machine/assembly, repetitive tasks, computer work  Barriers include:none  Prior functional level:  independent-shared household chores  Red flags:  none    Occupational Performance Deficits as reported by patient: child rearing, home establishment and management, sleep and work    Additional Occupational Profile Information (patterns of daily living, interests, values and needs): kids-2 and 3 yo, reading, running, playing soccer    Functional Outcome Measure:   Upper Extremity Functional Index Score:  SCORE:   Column Totals: 74/80  (A lower score indicates greater disability.)    Objective  Pain Level Report  VAS(0-10) 5/19/2017   At Rest: 0   At Worst: 7     Report of Pain:  Location:  Web space  Pain Quality:  cramping  Frequency: intermittent    Pain is worst:  Daytime-progressed as day went on  Exacerbated by:  Keyboarding, mousing  Relieved by:  OTC splint    Edema:  NONE  Sensation: WNL throughout all nerve distributions; per patient report.  Originally pt states tingling in median nerve  "oral supplements at home.     CURRENT NUTRITION ORDERS  Diet: 2000 mL Fluid Restriction (since 4/11) and Low Saturated Fat/2400 mg Sodium (since 4/13)    CURRENT INTAKE/TOLERANCE  Per nursing flowsheets (% intake), pt consistently consuming % with a good appetite with the exception of fair appetite noted 4/17-4/18 and consuming 50% once on 4/18 and once on 4/20. After brief introduction by this writer, pt stated \"I'm good.\" [employer] (meal ordering system) indicates food/beverages sent 4/18-4/20 totaled 1332 kcals and 51 g protein daily on average. Ordering two to three meals daily, often including fruit.  Decreased appetite per RNs at times, especially over the last few days    LABS  Nutrition-relevant labs: Reviewed    MEDICATIONS  Nutrition-relevant medications: Reviewed    ANTHROPOMETRICS  Height: 175.3 cm (5' 9\")   Admission Weight: (!) 149.6 kg (329 lb 14.4 oz) (04/12/25 0555)   Most Recent Weight: (!) 145.6 kg (321 lb) (04/20/25 0530)  IBW: 72.7 kg   BMI: Body mass index is 47.4 kg/m .   Weight History: 150.4 kg (4/3/2024), 145.6 kg (9/26/2024), 144.2 kg (11/18/2024), 150.6 kg (12/10/2024), 150.6 kg (2/12/2025), 141.5 kg (3/18/2025) 149.6 kg (4/12/2025), 145.6 kg (4/20) -  No significant/severe wt loss overall. Wt may vary as pt has LVAD in place.  Dosing Weight: 91 kg (adjusted, based on lowest wt so far this admission of 145.9 kg on 4/18)      ASSESSED NUTRITION NEEDS (for inpatient hospital stay)  Estimated Energy Needs: 8379-3966 kcals/day (20 - 25 kcals/kg)  Justification: Estimated needs with BMI  Estimated Protein Needs:  grams protein/day (1.1 - 1.4 grams of pro/kg)  Justification: Increased needs pending renal function  Estimated Fluid Needs: 2000 mL/day   Justification: On a fluid restriction    SYSTEM AND PHYSICAL FINDINGS    GI symptoms: Intermittent nausea  Skin/wounds: WOC 4/21:\"Areas visualized during today's visit: Focused: and Abdomen. Negative pressure wound therapy applied to: " distribution.    Observation:  Pt with forward head and protracted shoulders.  Tightness of B upper trap as well    ROM:  Wrist  5/19/2017   AROM(PROM) Left Right   Extension 74 75   Flexion 73 77   RD 16 16   UD 35 32     Special Tests:  Date 5/19/2017    Side Right    Elbow Flexion Test NT    Tinels at [place]'s Canal neg    Tinels at Cubital Tunnel neg    Ulnar Nerve ULTT Passive tension distal; active ~20% motion    Paresthesias None in the past week    [place]'s Sign neg    Phalens neg    Tinels at carpal neg    Tinels at pronator neg    Median Nerve ULTT Neg with passive    Radial Nerve ULTT Neg with passive    GAURAV's fatigue      Palaption: tightness noted in wrist flexors and extensors as well as thenars and thumb adductor.  Normal carpal mobility    Palpation Ulnar Nerve Path: Pain level report on scale 0-10/10  Date 5/19/2017    Side Right    [place] 1-2    Cubital Tunnel 0    [place] 1-2    Subscapularis 0      MMT:  Slight weakness of ADM and thumb Adductor    Strength: (Measured in pounds, pain scale 0-10/10)    Date 5/19/2017        Trials Left Right Left Right Left Right Left Right Left Right Left Right   1 78 80             2               3               Avg               Pain  0               3 Point Pinch  Date 5/19/2017        Trials Left Right Left Right Left Right Left Right Left Right Left Right   1 12 12             2               3               Avg               Pain  0               Lateral Pinch  Date 5/19/2017        Trials Left Right Left Right Left Right Left Right Left Right Left Right   1 14 13             2               3               Avg               Pain  0                   Assessment  Patient presents with symptoms consistent with above diagnosis,  with surgical  intervention.     Patient's limitations or Problem List includes:  Pain, Weakness, Decreased , Decreased pinch, neural tension and Tightness in musculature of the right hand and FA which  "Driveline site right abdomen. Wound due to: Surgical Wound. STATUS: stable\"   HEENT: Missing tooth/teeth    MALNUTRITION  % Intake: < 75% for > 7 days (moderate)  % Weight Loss: Weight loss does not meet criteria   Subcutaneous Fat Loss: None observed/visualized during brief visit  Muscle Loss: None observed/visualized during brief visit  Fluid Accumulation/Edema: Does not meet criteria    Malnutrition Diagnosis: Patient does not meet two of the established criteria necessary for diagnosing malnutrition but is at risk for malnutrition  Malnutrition Present on Admission: No    NUTRITION DIAGNOSIS  Inadequate oral intake related to decreased appetite and intermittent nausea as evidenced by food/beverages sent 4/18-4/20 totaled 1332 kcals and 51 g protein daily on average.     INTERVENTIONS  See nutrition interventions above    GOALS  Patient to consume % of nutritionally adequate meal trays TID, or the equivalent with supplements/snacks.     MONITORING/EVALUATION  Progress toward goals will be monitored and evaluated per policy.     Alisson Machuca, MS, RD, LD, CNSC      No longer available via pager  6C (beds [id number] and [id number]): Vocera \"6C Clinical Dietitian\"   Weekend/Holiday: Vocera \"Weekend Clinical Dietitian\"   " interferes with the patient's ability to perform Work Tasks, Sleep Patterns and Recreational Activities as compared to previous level of function.    Rehab Potential:  Excellent - Return to full activity, no limitations    Patient will benefit from skilled Occupational Therapy to increase flexibility, motion and overall strength and decrease pain, muscle and neural tension to return to previous activity level and resume normal daily tasks and to reach their rehab potential.    Assessment of Occupational Performance:  3-5 Performance Deficits  Identified Performance Deficits: child rearing, home establishment and management, sleep and work    Clinical Decision Making (Complexity): Low complexity    Barriers to Learning:  No barrier    Communication Issues:  Patient appears to be able to clearly communicate and understand verbal and written communication and follow directions correctly.    Treatment Explanation:  The following has been discussed with the patient:  RX ordered/plan of care  Anticipated outcomes  Possible risks and side effects    Plan  Frequency:  1 X week, once daily  Duration:  for 6 weeks    Treatment Plan:  Modalities:  US and Paraffin  Therapeutic Exercise:  AROM, AAROM, PROM, Tendon Gliding, Isotonics, Isometrics and Stabilization  Neuromuscular re-education:  Nerve Gliding, Kinesthetic Training, Proprioceptive Training, Posture, Kinesiotaping and Stabilization  Manual Techniques:  Joint mobilization, Friction massage and Myofascial release  Orthotic Fabrication:  Static orthosis  Self Care:  Self Care Tasks, Ergonomic Considerations, Work Tasks and Education    Discharge Plan:  Achieve all LTG.  Independent in home treatment program.  Reach maximal therapeutic benefit.    Home Exercise Program:  Wrist flex/ext stretch  Distal ulnar nerve glides-elbow at 90-wrist ext to neutral     Next Visit:  Passive nerve glides  MFR-flex/ext/web space